# Patient Record
Sex: FEMALE | Race: WHITE | Employment: STUDENT | URBAN - METROPOLITAN AREA
[De-identification: names, ages, dates, MRNs, and addresses within clinical notes are randomized per-mention and may not be internally consistent; named-entity substitution may affect disease eponyms.]

---
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
## 2019-01-10 ENCOUNTER — HOSPITAL ENCOUNTER (OUTPATIENT)
Age: 19
Discharge: HOME | End: 2019-01-10
Attending: FAMILY MEDICINE
Payer: COMMERCIAL

## 2019-01-10 VITALS
SYSTOLIC BLOOD PRESSURE: 114 MMHG | RESPIRATION RATE: 16 BRPM | OXYGEN SATURATION: 97 % | TEMPERATURE: 99 F | DIASTOLIC BLOOD PRESSURE: 78 MMHG | HEART RATE: 80 BPM

## 2019-01-10 DIAGNOSIS — N39.0 UTI (URINARY TRACT INFECTION), UNCOMPLICATED: Primary | ICD-10-CM

## 2019-01-10 LAB
POCT BILIRUBIN URINE: NEGATIVE
POCT GLUCOSE URINE: NEGATIVE MG/DL
POCT KETONE URINE: NEGATIVE MG/DL
POCT NITRITE URINE: POSITIVE
POCT PH URINE: 7 (ref 5–8)
POCT SPECIFIC GRAVITY URINE: 1.02
POCT URINE PREGNANCY: NEGATIVE
POCT UROBILINOGEN URINE: 0.2 MG/DL

## 2019-01-10 PROCEDURE — 87088 URINE BACTERIA CULTURE: CPT | Performed by: FAMILY MEDICINE

## 2019-01-10 PROCEDURE — 87186 SC STD MICRODIL/AGAR DIL: CPT | Performed by: FAMILY MEDICINE

## 2019-01-10 PROCEDURE — 81025 URINE PREGNANCY TEST: CPT | Performed by: FAMILY MEDICINE

## 2019-01-10 PROCEDURE — 99214 OFFICE O/P EST MOD 30 MIN: CPT

## 2019-01-10 PROCEDURE — 87086 URINE CULTURE/COLONY COUNT: CPT | Performed by: FAMILY MEDICINE

## 2019-01-10 PROCEDURE — 81002 URINALYSIS NONAUTO W/O SCOPE: CPT | Performed by: FAMILY MEDICINE

## 2019-01-10 RX ORDER — NITROFURANTOIN 25; 75 MG/1; MG/1
100 CAPSULE ORAL 2 TIMES DAILY
Qty: 10 CAPSULE | Refills: 0 | Status: SHIPPED | OUTPATIENT
Start: 2019-01-10 | End: 2019-01-15

## 2019-01-10 NOTE — ED PROVIDER NOTES
Patient Seen in: [facility] In [facility] & [facility]    History   Patient presents with:  Urinary Symptoms (urologic)    Stated Complaint: poss uti    HPI    This 78-year-old female presents to the office with a 3-day history of urinary urgency, frequen noted.  ABDOMEN: Soft, nontender, no guarding, rigidity or rebound, no masses or hepatosplenomegaly, normal bowel sounds in all four quadrants. BACK: No CVA tenderness is noted. EXTREMITIES: No clubbing, cyanosis, edema noted. SKIN: Warm and dry. medications until completed. See your doctor in 2-3 days if not better.

## 2019-01-12 NOTE — ED NOTES
Final urine culture positive. Bacteria sensitive to prescribed Nitrofurantoin. No further action required.

## 2019-02-06 ENCOUNTER — HOSPITAL ENCOUNTER (OUTPATIENT)
Age: 19
Discharge: HOME | End: 2019-02-06
Attending: FAMILY MEDICINE
Payer: COMMERCIAL

## 2019-02-06 VITALS
SYSTOLIC BLOOD PRESSURE: 106 MMHG | TEMPERATURE: 98 F | RESPIRATION RATE: 18 BRPM | OXYGEN SATURATION: 95 % | DIASTOLIC BLOOD PRESSURE: 74 MMHG | HEART RATE: 84 BPM

## 2019-02-06 DIAGNOSIS — J06.9 VIRAL UPPER RESPIRATORY TRACT INFECTION: Primary | ICD-10-CM

## 2019-02-06 PROCEDURE — 99214 OFFICE O/P EST MOD 30 MIN: CPT

## 2019-02-06 PROCEDURE — 99213 OFFICE O/P EST LOW 20 MIN: CPT

## 2019-02-06 RX ORDER — BENZONATATE 200 MG/1
200 CAPSULE ORAL 3 TIMES DAILY PRN
Qty: 30 CAPSULE | Refills: 0 | Status: SHIPPED | OUTPATIENT
Start: 2019-02-06 | End: 2019-02-16

## 2019-02-06 RX ORDER — BENZONATATE 200 MG/1
200 CAPSULE ORAL 3 TIMES DAILY PRN
Qty: 30 CAPSULE | Refills: 0 | Status: SHIPPED | OUTPATIENT
Start: 2019-02-06 | End: 2019-02-06

## 2019-02-06 NOTE — ED PROVIDER NOTES
Patient Seen in: [facility] In [facility] & [facility]    History   Patient presents with:  Cough/URI    Stated Complaint: cough x 3 days     HPI    This 44-year-old female presents to the office with a 3-day history of nonproductive cough.   She states th murmur noted. LUNGS: Clear to ausculation. No egophony noted. No retractions or tachypnea noted. SKIN: Warm and dry. ED Course   Labs Reviewed - No data to display             MDM     I reassured the patient and her mother that her lungs are clear.

## 2019-07-12 NOTE — LETTER
06/14/21        [address] [address]      Dear Carola Oneill records indicate that you have outstanding lab work and or testing that was ordered for you and has not yet been completed:  Orders Placed This Encou
No vomiting, no LOC./no fever

## 2019-08-15 ENCOUNTER — HOSPITAL ENCOUNTER (OUTPATIENT)
Age: 19
Discharge: HOME | End: 2019-08-15
Attending: FAMILY MEDICINE
Payer: COMMERCIAL

## 2019-08-15 VITALS
RESPIRATION RATE: 16 BRPM | SYSTOLIC BLOOD PRESSURE: 109 MMHG | TEMPERATURE: 98 F | DIASTOLIC BLOOD PRESSURE: 66 MMHG | HEART RATE: 85 BPM | OXYGEN SATURATION: 99 %

## 2019-08-15 DIAGNOSIS — J01.00 ACUTE NON-RECURRENT MAXILLARY SINUSITIS: Primary | ICD-10-CM

## 2019-08-15 PROCEDURE — 99213 OFFICE O/P EST LOW 20 MIN: CPT

## 2019-08-15 PROCEDURE — 99214 OFFICE O/P EST MOD 30 MIN: CPT

## 2019-08-15 RX ORDER — AMOXICILLIN 875 MG/1
875 TABLET, COATED ORAL EVERY 12 HOURS
Qty: 20 TABLET | Refills: 0 | Status: SHIPPED | OUTPATIENT
Start: 2019-08-15 | End: 2019-08-15 | Stop reason: CLARIF

## 2019-08-15 RX ORDER — AMOXICILLIN 875 MG/1
875 TABLET, COATED ORAL EVERY 12 HOURS
Qty: 20 TABLET | Refills: 0 | Status: SHIPPED | OUTPATIENT
Start: 2019-08-15 | End: 2019-08-25

## 2019-08-16 NOTE — ED PROVIDER NOTES
Patient Seen in: [facility] & [facility]    History   Patient presents with:  Sinusitis    Stated Complaint: sinus infection    HPI  26-year-old female with her mom presents to immediate care with a 10-day history of nasal congestion postnas supple. No thyromegaly present. Cardiovascular: Normal rate, regular rhythm, normal heart sounds and intact distal pulses. Pulmonary/Chest: Effort normal and breath sounds normal. No respiratory distress. Abdominal: Soft.  Bowel sounds are normal. The

## 2019-08-16 NOTE — ED INITIAL ASSESSMENT (HPI)
Pt c/o sinus pressure, nasal congestion x 1.5 weeks with post nasal drip and some ear pressure today. No fever or chills.

## 2019-10-17 ENCOUNTER — HOSPITAL ENCOUNTER (OUTPATIENT)
Age: 19
Discharge: HOME | End: 2019-10-17
Payer: COMMERCIAL

## 2019-10-17 VITALS
OXYGEN SATURATION: 100 % | HEIGHT: 65 IN | SYSTOLIC BLOOD PRESSURE: 111 MMHG | HEART RATE: 100 BPM | TEMPERATURE: 98 F | DIASTOLIC BLOOD PRESSURE: 64 MMHG | RESPIRATION RATE: 16 BRPM | WEIGHT: 105 LBS | BODY MASS INDEX: 17.49 KG/M2

## 2019-10-17 DIAGNOSIS — G43.809 OTHER MIGRAINE WITHOUT STATUS MIGRAINOSUS, NOT INTRACTABLE: Primary | ICD-10-CM

## 2019-10-17 PROCEDURE — 99214 OFFICE O/P EST MOD 30 MIN: CPT

## 2019-10-17 PROCEDURE — 99213 OFFICE O/P EST LOW 20 MIN: CPT

## 2019-10-17 RX ORDER — DEXAMETHASONE SODIUM PHOSPHATE 4 MG/ML
10 VIAL (ML) INJECTION ONCE
Status: COMPLETED | OUTPATIENT
Start: 2019-10-17 | End: 2019-10-17

## 2019-10-17 RX ORDER — ACETAMINOPHEN, ASPIRIN AND CAFFEINE 250; 250; 65 MG/1; MG/1; MG/1
1 TABLET, FILM COATED ORAL EVERY 6 HOURS PRN
COMMUNITY
End: 2019-11-26 | Stop reason: ALTCHOICE

## 2019-10-17 RX ORDER — IBUPROFEN 600 MG/1
600 TABLET ORAL EVERY 6 HOURS PRN
COMMUNITY
End: 2019-11-26 | Stop reason: ALTCHOICE

## 2019-10-17 RX ORDER — BUTALBITAL, ACETAMINOPHEN AND CAFFEINE 50; 325; 40 MG/1; MG/1; MG/1
1 TABLET ORAL EVERY 4 HOURS PRN
Qty: 10 TABLET | Refills: 0 | Status: SHIPPED | OUTPATIENT
Start: 2019-10-17 | End: 2019-10-24

## 2019-10-17 RX ORDER — ONDANSETRON 4 MG/1
4 TABLET, ORALLY DISINTEGRATING ORAL ONCE
Status: COMPLETED | OUTPATIENT
Start: 2019-10-17 | End: 2019-10-17

## 2019-10-17 RX ORDER — ONDANSETRON 4 MG/1
4 TABLET, ORALLY DISINTEGRATING ORAL EVERY 4 HOURS PRN
Qty: 10 TABLET | Refills: 0 | Status: SHIPPED | OUTPATIENT
Start: 2019-10-17 | End: 2019-10-24

## 2019-10-18 ENCOUNTER — HOSPITAL ENCOUNTER (EMERGENCY)
Facility: HOSPITAL | Age: 19
Discharge: HOME | End: 2019-10-18
Attending: EMERGENCY MEDICINE
Payer: COMMERCIAL

## 2019-10-18 VITALS
OXYGEN SATURATION: 100 % | HEART RATE: 92 BPM | TEMPERATURE: 99 F | DIASTOLIC BLOOD PRESSURE: 84 MMHG | SYSTOLIC BLOOD PRESSURE: 98 MMHG | RESPIRATION RATE: 17 BRPM | HEIGHT: 65 IN | BODY MASS INDEX: 17.49 KG/M2 | WEIGHT: 105 LBS

## 2019-10-18 DIAGNOSIS — G43.009 MIGRAINE WITHOUT AURA AND WITHOUT STATUS MIGRAINOSUS, NOT INTRACTABLE: Primary | ICD-10-CM

## 2019-10-18 DIAGNOSIS — E87.6 HYPOKALEMIA: ICD-10-CM

## 2019-10-18 PROCEDURE — 96361 HYDRATE IV INFUSION ADD-ON: CPT

## 2019-10-18 PROCEDURE — 96374 THER/PROPH/DIAG INJ IV PUSH: CPT

## 2019-10-18 PROCEDURE — 85025 COMPLETE CBC W/AUTO DIFF WBC: CPT | Performed by: PHYSICIAN ASSISTANT

## 2019-10-18 PROCEDURE — 96375 TX/PRO/DX INJ NEW DRUG ADDON: CPT

## 2019-10-18 PROCEDURE — 80053 COMPREHEN METABOLIC PANEL: CPT | Performed by: PHYSICIAN ASSISTANT

## 2019-10-18 PROCEDURE — 81025 URINE PREGNANCY TEST: CPT

## 2019-10-18 PROCEDURE — 99284 EMERGENCY DEPT VISIT MOD MDM: CPT

## 2019-10-18 RX ORDER — METOCLOPRAMIDE HYDROCHLORIDE 5 MG/ML
10 INJECTION INTRAMUSCULAR; INTRAVENOUS ONCE
Status: COMPLETED | OUTPATIENT
Start: 2019-10-18 | End: 2019-10-18

## 2019-10-18 RX ORDER — KETOROLAC TROMETHAMINE 30 MG/ML
15 INJECTION, SOLUTION INTRAMUSCULAR; INTRAVENOUS ONCE
Status: COMPLETED | OUTPATIENT
Start: 2019-10-18 | End: 2019-10-18

## 2019-10-18 RX ORDER — POTASSIUM CHLORIDE 20 MEQ/1
40 TABLET, EXTENDED RELEASE ORAL ONCE
Status: COMPLETED | OUTPATIENT
Start: 2019-10-18 | End: 2019-10-18

## 2019-10-18 RX ORDER — DIPHENHYDRAMINE HYDROCHLORIDE 50 MG/ML
25 INJECTION INTRAMUSCULAR; INTRAVENOUS ONCE
Status: COMPLETED | OUTPATIENT
Start: 2019-10-18 | End: 2019-10-18

## 2019-10-18 NOTE — ED PROVIDER NOTES
Patient Seen in: [facility] & [facility]      History   Patient presents with:  Headache (neurologic)    Stated Complaint: Migraine all day    HPI    Lebron Razo is a 43-year-old female who presents today for evaluation of migraine that began th 17.47 kg/m²         Physical Exam  Nursing note reviewed. Vital signs reviewed. General: Patient is A&O x 4; Normal gait; normal speech  Neuro: Cranial nerves III - XII grossly intact. Extremity strength is 5/5 and equal bilaterally.  Sensation is equal bi condition throughout the visit today and remains so upon discharge. I discuss the plan of care with the patient, who expresses understanding. All questions and concerns are addressed to the patient's satisfaction prior to discharge today.     Disposition

## 2019-10-18 NOTE — ED INITIAL ASSESSMENT (HPI)
Pt states hx of migraines. Pt states migraine stated yesterday. Pt states pain to left side of head. Pt states hx of numbness to bilateral feet. Pt notes numbness increased since yesterday to her left foot up to her knee.   Pt states mild nausea, denies

## 2019-10-18 NOTE — ED PROVIDER NOTES
Patient Seen in: [facility] Emergency Department      History   Patient presents with:  Headache (neurologic)  Numbness Weakness (neurologic)    Stated Complaint: migraine, left sided numbness x2 days    HPI    CHIEF COMPLAINT: Migraine for the past noncontributory to the presenting problem, except as indicated as above. Past Medical History:   Diagnosis Date   • Headache    • Migraines               History reviewed. No pertinent surgical history.                  Social History    Tobacco Use refill  Musculoskeletal: neck is supple, no lymphadenopathy, non tender. no meningeal signs. Extremities are symmetrical, full range of motion; normal sensation bilaterally.   Psychiatric: calm and cooperative    ED Course     Labs Reviewed   COMP METABOLI will be discharged home with instructions to follow-up with neurology. She can continue taking the Fioricet as previously prescribed. If there are any new, changing or worsening symptoms return for reevaluation.   She voiced understanding to the treatment

## 2020-05-08 PROBLEM — J34.2 NASAL SEPTAL DEVIATION: Status: ACTIVE | Noted: 2020-05-08

## 2020-05-08 PROBLEM — R09.81 NASAL CONGESTION: Status: ACTIVE | Noted: 2020-05-08

## 2021-05-14 ENCOUNTER — OFFICE VISIT (OUTPATIENT)
Dept: INTERNAL MEDICINE CLINIC | Facility: CLINIC | Age: 21
End: 2021-05-14
Payer: COMMERCIAL

## 2021-05-14 VITALS
TEMPERATURE: 99 F | SYSTOLIC BLOOD PRESSURE: 102 MMHG | HEART RATE: 70 BPM | BODY MASS INDEX: 20.83 KG/M2 | HEIGHT: 65 IN | WEIGHT: 125 LBS | OXYGEN SATURATION: 100 % | RESPIRATION RATE: 18 BRPM | DIASTOLIC BLOOD PRESSURE: 68 MMHG

## 2021-05-14 DIAGNOSIS — Z00.00 ROUTINE GENERAL MEDICAL EXAMINATION AT A HEALTH CARE FACILITY: ICD-10-CM

## 2021-05-14 DIAGNOSIS — G43.009 MIGRAINE WITHOUT AURA AND WITHOUT STATUS MIGRAINOSUS, NOT INTRACTABLE: Primary | ICD-10-CM

## 2021-05-14 PROCEDURE — 3078F DIAST BP <80 MM HG: CPT | Performed by: INTERNAL MEDICINE

## 2021-05-14 PROCEDURE — 90471 IMMUNIZATION ADMIN: CPT | Performed by: INTERNAL MEDICINE

## 2021-05-14 PROCEDURE — 3074F SYST BP LT 130 MM HG: CPT | Performed by: INTERNAL MEDICINE

## 2021-05-14 PROCEDURE — 99385 PREV VISIT NEW AGE 18-39: CPT | Performed by: INTERNAL MEDICINE

## 2021-05-14 PROCEDURE — 90715 TDAP VACCINE 7 YRS/> IM: CPT | Performed by: INTERNAL MEDICINE

## 2021-05-14 PROCEDURE — 3008F BODY MASS INDEX DOCD: CPT | Performed by: INTERNAL MEDICINE

## 2021-05-14 RX ORDER — PROPRANOLOL HYDROCHLORIDE 20 MG/1
TABLET ORAL
Qty: 90 TABLET | Refills: 3 | Status: SHIPPED | OUTPATIENT
Start: 2021-05-14

## 2021-05-14 NOTE — PROGRESS NOTES
Mohamud Artist is a 23year old female. HPI:   Patient presents for a physical exam  1. Essential Tremors and Migraines: uses propranolol which is very effective. Has not had a migraine in 2 years since she was hospitalized for it.   2. Lifestyle: g kg)  02/13/20 : 105 lb 8 oz (47.9 kg)  11/26/19 : 106 lb 8 oz (48.3 kg)  10/18/19 : 105 lb (47.6 kg)      No Known Allergies    Family History   Problem Relation Age of Onset   • Heart Disorder Paternal Grandmother    • Breast Cancer Maternal Grandmother Screening: discuss screening at age 36. Bone Health/Fall Prevention (Javier Chi): educated on dietary calcium/vit D3, weight bearing exercises and fall prevention  Vaccines: TDAP today. Cont yearly flu shot.  Up to date with COVID  Hep C screening: Ab longoria

## 2021-05-14 NOTE — PATIENT INSTRUCTIONS
You need 3 eight ounce servings of calcium/vitamin D daily. This includes spinach, kale, broccoli, almonds, milk, yogurt, or cottage cheese. I advise you get the annual flu shot every September, October.

## 2022-06-06 RX ORDER — PROPRANOLOL HYDROCHLORIDE 20 MG/1
TABLET ORAL
Qty: 270 TABLET | Refills: 2 | OUTPATIENT
Start: 2022-06-06

## 2022-06-07 RX ORDER — PROPRANOLOL HYDROCHLORIDE 20 MG/1
TABLET ORAL
Qty: 270 TABLET | Refills: 0 | OUTPATIENT
Start: 2022-06-07

## 2022-06-07 RX ORDER — PROPRANOLOL HYDROCHLORIDE 20 MG/1
TABLET ORAL
Qty: 90 TABLET | Refills: 0 | Status: SHIPPED | OUTPATIENT
Start: 2022-06-07 | End: 2022-07-11

## 2022-06-07 NOTE — TELEPHONE ENCOUNTER
Appointment scheduled - rx needed     propranolol 20 MG Oral Tab    Future Appointments   Date Time Provider Douglas Holguin   7/25/2022 11:30 AM David Valadez MD [facility] [place]

## 2022-07-11 RX ORDER — PROPRANOLOL HYDROCHLORIDE 20 MG/1
TABLET ORAL
Qty: 90 TABLET | Refills: 0 | Status: SHIPPED | OUTPATIENT
Start: 2022-07-11

## 2022-07-11 NOTE — TELEPHONE ENCOUNTER
Protocol failed     Requesting: propanolol 20mg     LOV: 5/14/21   RTC: 1 yr   Filled: 6/7/22 #90 0 refills   Recent Labs: 10/18/19     Upcoming OV: 7/25/22

## 2022-07-25 ENCOUNTER — OFFICE VISIT (OUTPATIENT)
Dept: INTERNAL MEDICINE CLINIC | Facility: CLINIC | Age: 22
End: 2022-07-25
Payer: COMMERCIAL

## 2022-07-25 VITALS
RESPIRATION RATE: 16 BRPM | HEART RATE: 61 BPM | OXYGEN SATURATION: 98 % | WEIGHT: 129.19 LBS | BODY MASS INDEX: 21.27 KG/M2 | DIASTOLIC BLOOD PRESSURE: 69 MMHG | SYSTOLIC BLOOD PRESSURE: 90 MMHG | TEMPERATURE: 98 F | HEIGHT: 65.5 IN

## 2022-07-25 DIAGNOSIS — Z00.00 ROUTINE GENERAL MEDICAL EXAMINATION AT A HEALTH CARE FACILITY: Primary | ICD-10-CM

## 2022-07-25 PROCEDURE — 99395 PREV VISIT EST AGE 18-39: CPT | Performed by: INTERNAL MEDICINE

## 2022-07-25 PROCEDURE — 3078F DIAST BP <80 MM HG: CPT | Performed by: INTERNAL MEDICINE

## 2022-07-25 PROCEDURE — 3074F SYST BP LT 130 MM HG: CPT | Performed by: INTERNAL MEDICINE

## 2022-07-25 PROCEDURE — 3008F BODY MASS INDEX DOCD: CPT | Performed by: INTERNAL MEDICINE

## 2022-07-25 RX ORDER — PROPRANOLOL HYDROCHLORIDE 20 MG/1
TABLET ORAL
Qty: 270 TABLET | Refills: 3 | Status: SHIPPED | OUTPATIENT
Start: 2022-07-25

## 2022-08-03 ENCOUNTER — LAB ENCOUNTER (OUTPATIENT)
Dept: LAB | Age: 22
End: 2022-08-03
Attending: INTERNAL MEDICINE
Payer: COMMERCIAL

## 2022-08-03 DIAGNOSIS — Z00.00 ROUTINE GENERAL MEDICAL EXAMINATION AT A HEALTH CARE FACILITY: ICD-10-CM

## 2022-08-03 LAB
ALT SERPL-CCNC: 19 U/L
ANION GAP SERPL CALC-SCNC: 8 MMOL/L (ref 0–18)
AST SERPL-CCNC: 11 U/L (ref 15–37)
BASOPHILS # BLD AUTO: 0.06 X10(3) UL (ref 0–0.2)
BASOPHILS NFR BLD AUTO: 1.1 %
BUN BLD-MCNC: 11 MG/DL (ref 7–18)
CALCIUM BLD-MCNC: 8.8 MG/DL (ref 8.5–10.1)
CHLORIDE SERPL-SCNC: 108 MMOL/L (ref 98–112)
CO2 SERPL-SCNC: 22 MMOL/L (ref 21–32)
CREAT BLD-MCNC: 0.63 MG/DL
EOSINOPHIL # BLD AUTO: 0.31 X10(3) UL (ref 0–0.7)
EOSINOPHIL NFR BLD AUTO: 5.6 %
ERYTHROCYTE [DISTWIDTH] IN BLOOD BY AUTOMATED COUNT: 12 %
FASTING STATUS PATIENT QL REPORTED: YES
GFR SERPLBLD BASED ON 1.73 SQ M-ARVRAT: 128 ML/MIN/1.73M2 (ref 60–?)
GLUCOSE BLD-MCNC: 90 MG/DL (ref 70–99)
HCT VFR BLD AUTO: 39 %
HCV AB SERPL QL IA: NONREACTIVE
HGB BLD-MCNC: 12.7 G/DL
IMM GRANULOCYTES # BLD AUTO: 0.02 X10(3) UL (ref 0–1)
IMM GRANULOCYTES NFR BLD: 0.4 %
LYMPHOCYTES # BLD AUTO: 2.17 X10(3) UL (ref 1–4)
LYMPHOCYTES NFR BLD AUTO: 39.4 %
MCH RBC QN AUTO: 32.2 PG (ref 26–34)
MCHC RBC AUTO-ENTMCNC: 32.6 G/DL (ref 31–37)
MCV RBC AUTO: 99 FL
MONOCYTES # BLD AUTO: 0.44 X10(3) UL (ref 0.1–1)
MONOCYTES NFR BLD AUTO: 8 %
NEUTROPHILS # BLD AUTO: 2.51 X10 (3) UL (ref 1.5–7.7)
NEUTROPHILS # BLD AUTO: 2.51 X10(3) UL (ref 1.5–7.7)
NEUTROPHILS NFR BLD AUTO: 45.5 %
OSMOLALITY SERPL CALC.SUM OF ELEC: 285 MOSM/KG (ref 275–295)
PLATELET # BLD AUTO: 308 10(3)UL (ref 150–450)
POTASSIUM SERPL-SCNC: 4.5 MMOL/L (ref 3.5–5.1)
RBC # BLD AUTO: 3.94 X10(6)UL
SODIUM SERPL-SCNC: 138 MMOL/L (ref 136–145)
WBC # BLD AUTO: 5.5 X10(3) UL (ref 4–11)

## 2022-08-03 PROCEDURE — 85025 COMPLETE CBC W/AUTO DIFF WBC: CPT

## 2022-08-03 PROCEDURE — 86803 HEPATITIS C AB TEST: CPT

## 2022-08-03 PROCEDURE — 84460 ALANINE AMINO (ALT) (SGPT): CPT

## 2022-08-03 PROCEDURE — 84450 TRANSFERASE (AST) (SGOT): CPT

## 2022-08-03 PROCEDURE — 36415 COLL VENOUS BLD VENIPUNCTURE: CPT

## 2022-08-03 PROCEDURE — 80048 BASIC METABOLIC PNL TOTAL CA: CPT

## 2023-08-18 ENCOUNTER — LAB ENCOUNTER (OUTPATIENT)
Dept: LAB | Age: 23
End: 2023-08-18
Attending: INTERNAL MEDICINE
Payer: COMMERCIAL

## 2023-08-18 ENCOUNTER — OFFICE VISIT (OUTPATIENT)
Dept: INTERNAL MEDICINE CLINIC | Facility: CLINIC | Age: 23
End: 2023-08-18
Payer: COMMERCIAL

## 2023-08-18 VITALS
TEMPERATURE: 99 F | WEIGHT: 127.63 LBS | HEIGHT: 64.5 IN | DIASTOLIC BLOOD PRESSURE: 54 MMHG | SYSTOLIC BLOOD PRESSURE: 92 MMHG | BODY MASS INDEX: 21.52 KG/M2 | HEART RATE: 68 BPM | RESPIRATION RATE: 12 BRPM | OXYGEN SATURATION: 100 %

## 2023-08-18 DIAGNOSIS — Z00.00 ROUTINE GENERAL MEDICAL EXAMINATION AT A HEALTH CARE FACILITY: Primary | ICD-10-CM

## 2023-08-18 DIAGNOSIS — G43.009 MIGRAINE WITHOUT AURA AND WITHOUT STATUS MIGRAINOSUS, NOT INTRACTABLE: ICD-10-CM

## 2023-08-18 DIAGNOSIS — Z00.00 ROUTINE GENERAL MEDICAL EXAMINATION AT A HEALTH CARE FACILITY: ICD-10-CM

## 2023-08-18 LAB
ANION GAP SERPL CALC-SCNC: 2 MMOL/L (ref 0–18)
BASOPHILS # BLD AUTO: 0.09 X10(3) UL (ref 0–0.2)
BASOPHILS NFR BLD AUTO: 1.4 %
BUN BLD-MCNC: 8 MG/DL (ref 7–18)
CALCIUM BLD-MCNC: 9.7 MG/DL (ref 8.5–10.1)
CHLORIDE SERPL-SCNC: 107 MMOL/L (ref 98–112)
CO2 SERPL-SCNC: 28 MMOL/L (ref 21–32)
CREAT BLD-MCNC: 0.7 MG/DL
DEPRECATED HBV CORE AB SER IA-ACNC: 39.3 NG/ML
EGFRCR SERPLBLD CKD-EPI 2021: 124 ML/MIN/1.73M2 (ref 60–?)
EOSINOPHIL # BLD AUTO: 0.25 X10(3) UL (ref 0–0.7)
EOSINOPHIL NFR BLD AUTO: 3.8 %
ERYTHROCYTE [DISTWIDTH] IN BLOOD BY AUTOMATED COUNT: 11.7 %
FASTING STATUS PATIENT QL REPORTED: NO
GLUCOSE BLD-MCNC: 83 MG/DL (ref 70–99)
HCT VFR BLD AUTO: 38.9 %
HGB BLD-MCNC: 13.4 G/DL
IMM GRANULOCYTES # BLD AUTO: 0.02 X10(3) UL (ref 0–1)
IMM GRANULOCYTES NFR BLD: 0.3 %
LYMPHOCYTES # BLD AUTO: 1.96 X10(3) UL (ref 1–4)
LYMPHOCYTES NFR BLD AUTO: 29.7 %
MAGNESIUM SERPL-MCNC: 2.3 MG/DL (ref 1.6–2.6)
MCH RBC QN AUTO: 31.9 PG (ref 26–34)
MCHC RBC AUTO-ENTMCNC: 34.4 G/DL (ref 31–37)
MCV RBC AUTO: 92.6 FL
MONOCYTES # BLD AUTO: 0.55 X10(3) UL (ref 0.1–1)
MONOCYTES NFR BLD AUTO: 8.3 %
NEUTROPHILS # BLD AUTO: 3.72 X10 (3) UL (ref 1.5–7.7)
NEUTROPHILS # BLD AUTO: 3.72 X10(3) UL (ref 1.5–7.7)
NEUTROPHILS NFR BLD AUTO: 56.5 %
OSMOLALITY SERPL CALC.SUM OF ELEC: 281 MOSM/KG (ref 275–295)
PLATELET # BLD AUTO: 347 10(3)UL (ref 150–450)
POTASSIUM SERPL-SCNC: 3.8 MMOL/L (ref 3.5–5.1)
RBC # BLD AUTO: 4.2 X10(6)UL
SODIUM SERPL-SCNC: 137 MMOL/L (ref 136–145)
T4 FREE SERPL-MCNC: 1.3 NG/DL (ref 0.8–1.7)
TSI SER-ACNC: 0.62 MIU/ML (ref 0.36–3.74)
WBC # BLD AUTO: 6.6 X10(3) UL (ref 4–11)

## 2023-08-18 PROCEDURE — 83735 ASSAY OF MAGNESIUM: CPT | Performed by: INTERNAL MEDICINE

## 2023-08-18 PROCEDURE — 84443 ASSAY THYROID STIM HORMONE: CPT | Performed by: INTERNAL MEDICINE

## 2023-08-18 PROCEDURE — 82728 ASSAY OF FERRITIN: CPT | Performed by: INTERNAL MEDICINE

## 2023-08-18 PROCEDURE — 3074F SYST BP LT 130 MM HG: CPT | Performed by: INTERNAL MEDICINE

## 2023-08-18 PROCEDURE — 99395 PREV VISIT EST AGE 18-39: CPT | Performed by: INTERNAL MEDICINE

## 2023-08-18 PROCEDURE — 85025 COMPLETE CBC W/AUTO DIFF WBC: CPT | Performed by: INTERNAL MEDICINE

## 2023-08-18 PROCEDURE — 80048 BASIC METABOLIC PNL TOTAL CA: CPT | Performed by: INTERNAL MEDICINE

## 2023-08-18 PROCEDURE — 84439 ASSAY OF FREE THYROXINE: CPT | Performed by: INTERNAL MEDICINE

## 2023-08-18 PROCEDURE — 3078F DIAST BP <80 MM HG: CPT | Performed by: INTERNAL MEDICINE

## 2023-08-18 PROCEDURE — 3008F BODY MASS INDEX DOCD: CPT | Performed by: INTERNAL MEDICINE

## 2023-08-18 RX ORDER — PROPRANOLOL HYDROCHLORIDE 20 MG/1
20 TABLET ORAL 3 TIMES DAILY
Qty: 270 TABLET | Refills: 3 | Status: SHIPPED | OUTPATIENT
Start: 2023-08-18

## 2023-08-18 NOTE — PATIENT INSTRUCTIONS
I recommend the following vaccines -  Annual flu shot every September, October. Stay up to date with COVID vaccines.

## (undated) NOTE — ED AVS SNAPSHOT
Eloise Demarco   MRN: [id number]    Department:  [facility] Emergency Department   Date of Visit:  10/18/2019           Disclosure     Insurance plans vary and the physician(s) referred by the ER may not be covered by your plan.  Please contact tell this physician (or your personal doctor if your instructions are to return to your personal doctor) about any new or lasting problems. The primary care or specialist physician will see patients referred from the [facility] Emergency Department.  Tyrone Anton